# Patient Record
Sex: FEMALE | NOT HISPANIC OR LATINO
[De-identification: names, ages, dates, MRNs, and addresses within clinical notes are randomized per-mention and may not be internally consistent; named-entity substitution may affect disease eponyms.]

---

## 2024-06-18 ENCOUNTER — DASHBOARD ENCOUNTERS (OUTPATIENT)
Age: 54
End: 2024-06-18

## 2024-06-18 ENCOUNTER — TELEPHONE ENCOUNTER (OUTPATIENT)
Dept: URBAN - METROPOLITAN AREA CLINIC 80 | Facility: CLINIC | Age: 54
End: 2024-06-18

## 2024-06-18 ENCOUNTER — OFFICE VISIT (OUTPATIENT)
Dept: URBAN - METROPOLITAN AREA CLINIC 2 | Facility: CLINIC | Age: 54
End: 2024-06-18
Payer: COMMERCIAL

## 2024-06-18 VITALS
HEIGHT: 60 IN | WEIGHT: 125 LBS | HEART RATE: 93 BPM | DIASTOLIC BLOOD PRESSURE: 90 MMHG | TEMPERATURE: 98.5 F | BODY MASS INDEX: 24.54 KG/M2 | SYSTOLIC BLOOD PRESSURE: 124 MMHG

## 2024-06-18 DIAGNOSIS — K58.2 IRRITABLE BOWEL SYNDROME WITH BOTH CONSTIPATION AND DIARRHEA: ICD-10-CM

## 2024-06-18 DIAGNOSIS — Z12.11 COLON CANCER SCREENING: ICD-10-CM

## 2024-06-18 PROBLEM — 10743008: Status: ACTIVE | Noted: 2024-06-18

## 2024-06-18 PROCEDURE — 99243 OFF/OP CNSLTJ NEW/EST LOW 30: CPT | Performed by: NURSE PRACTITIONER

## 2024-06-18 PROCEDURE — 99203 OFFICE O/P NEW LOW 30 MIN: CPT | Performed by: NURSE PRACTITIONER

## 2024-06-18 RX ORDER — LEVOTHYROXINE SODIUM 25 UG/1
TABLET ORAL
Qty: 90 TABLET | Status: ACTIVE | COMMUNITY

## 2024-06-18 RX ORDER — EZETIMIBE 10 MG/1
TAKE 1 TABLET BY MOUTH EVERY DAY FOR 90 DAYS TABLET ORAL
Qty: 90 EACH | Refills: 0 | Status: ACTIVE | COMMUNITY

## 2024-06-18 RX ORDER — TOPIRAMATE 25 MG/1
TAKE 1 TABLET BY MOUTH EVERY DAY TABLET ORAL
Qty: 90 EACH | Refills: 0 | Status: ACTIVE | COMMUNITY

## 2024-06-18 RX ORDER — EPINEPHRINE 0.3 MG/.3ML
INJECTION SUBCUTANEOUS
Qty: 2 EACH | Status: ACTIVE | COMMUNITY

## 2024-06-18 RX ORDER — OMALIZUMAB 150 MG/ML
INJECTION, SOLUTION SUBCUTANEOUS
Qty: 2 MILLILITER | Status: ACTIVE | COMMUNITY

## 2024-06-18 RX ORDER — CLONAZEPAM 1 MG/1
TAKE 1 TABLET BY MOUTH EVERY DAY TABLET ORAL
Qty: 30 EACH | Refills: 3 | Status: ACTIVE | COMMUNITY

## 2024-06-18 RX ORDER — MONTELUKAST SODIUM 10 MG/1
TAKE 1 TABLET BY MOUTH EVERY DAY FOR 90 DAYS TABLET, FILM COATED ORAL
Qty: 90 EACH | Refills: 1 | Status: ACTIVE | COMMUNITY

## 2024-06-18 RX ORDER — SOD SULF/POT CHLORIDE/MAG SULF 1.479 G
12 TABLETS THE FIRST DOSE THE EVENING BEFORE AND SECOND DOSE THE MORNING OF COLONOSCOPY TABLET ORAL TWICE A DAY
Qty: 24 | OUTPATIENT
Start: 2024-06-18 | End: 2024-06-19

## 2024-06-18 RX ORDER — ESCITALOPRAM OXALATE 20 MG/1
TAKE 1 TABLET BY MOUTH EVERY DAY FOR 90 DAYS TABLET ORAL
Qty: 90 EACH | Refills: 0 | Status: ACTIVE | COMMUNITY

## 2024-06-18 RX ORDER — DICYCLOMINE HYDROCHLORIDE 20 MG/1
1 TABLET TABLET ORAL
Qty: 90 TABLET | Refills: 1 | OUTPATIENT
Start: 2024-06-18 | End: 2024-08-17

## 2024-06-18 NOTE — HPI-TODAY'S VISIT:
Very pleasant 53 yr old female seen today for c/o bloating, constipation and abdominal pain. This patient was referred by Dr. Mohsin Hisamuddin for an evaluation of above.  A copy of this will be sent to the referring provider.  Very pleasant 53-year-old female seen today for colon cancer screening.  She did have a normal Cologuard last year.  No known family history of colon polyps or colon cancer.  Patient also has a history of what sounds like IBS with both diarrhea and constipation.  She does have lower abdominal cramping.  She did take a dicyclomine from her sister which helped with the cramping.  She also reports a history of diverticulosis.  She had her gallbladder removed 2 years ago.  Since then she has had urgency with bowel movements especially after meals.

## 2024-06-18 NOTE — PHYSICAL EXAM SKIN:
no rashes , no jaundice present , good turgor , no masses , no tenderness on palpation Spontaneous, unlabored and symmetrical

## 2024-06-24 ENCOUNTER — OUT OF OFFICE VISIT (OUTPATIENT)
Dept: URBAN - METROPOLITAN AREA SURGERY CENTER 19 | Facility: SURGERY CENTER | Age: 54
End: 2024-06-24
Payer: COMMERCIAL

## 2024-06-24 ENCOUNTER — OFFICE VISIT (OUTPATIENT)
Dept: URBAN - METROPOLITAN AREA SURGERY CENTER 19 | Facility: SURGERY CENTER | Age: 54
End: 2024-06-24

## 2024-06-24 ENCOUNTER — CLAIMS CREATED FROM THE CLAIM WINDOW (OUTPATIENT)
Dept: URBAN - METROPOLITAN AREA CLINIC 4 | Facility: CLINIC | Age: 54
End: 2024-06-24
Payer: COMMERCIAL

## 2024-06-24 DIAGNOSIS — K63.5 COLON POLYPS: ICD-10-CM

## 2024-06-24 DIAGNOSIS — K63.89 OTHER SPECIFIED DISEASES OF INTESTINE: ICD-10-CM

## 2024-06-24 DIAGNOSIS — Z12.11 COLON CANCER SCREENING: ICD-10-CM

## 2024-06-24 DIAGNOSIS — D12.5 BENIGN NEOPLASM OF SIGMOID COLON: ICD-10-CM

## 2024-06-24 DIAGNOSIS — D12.4 BENIGN NEOPLASM OF DESCENDING COLON: ICD-10-CM

## 2024-06-24 DIAGNOSIS — K57.30 DIVERTICULA OF COLON: ICD-10-CM

## 2024-06-24 PROCEDURE — 00812 ANES LWR INTST SCR COLSC: CPT | Performed by: ANESTHESIOLOGY

## 2024-06-24 PROCEDURE — 88305 TISSUE EXAM BY PATHOLOGIST: CPT | Performed by: PATHOLOGY

## 2024-06-24 PROCEDURE — 00812 ANES LWR INTST SCR COLSC: CPT | Performed by: STUDENT IN AN ORGANIZED HEALTH CARE EDUCATION/TRAINING PROGRAM

## 2024-06-27 ENCOUNTER — TELEPHONE ENCOUNTER (OUTPATIENT)
Dept: URBAN - METROPOLITAN AREA CLINIC 2 | Facility: CLINIC | Age: 54
End: 2024-06-27

## 2024-06-27 RX ORDER — BISACODYL 5 MG
1 TABLET AS NEEDED TABLET, DELAYED RELEASE (ENTERIC COATED) ORAL TWICE A DAY
Qty: 60 TABLET | Refills: 1 | OUTPATIENT
Start: 2024-06-28 | End: 2024-08-27

## 2024-07-01 ENCOUNTER — TELEPHONE ENCOUNTER (OUTPATIENT)
Dept: URBAN - METROPOLITAN AREA CLINIC 80 | Facility: CLINIC | Age: 54
End: 2024-07-01

## 2024-07-02 PROBLEM — 397881000: Status: ACTIVE | Noted: 2024-07-02

## 2024-07-02 PROBLEM — 428283002: Status: ACTIVE | Noted: 2024-07-02

## 2024-07-03 ENCOUNTER — OFFICE VISIT (OUTPATIENT)
Dept: URBAN - METROPOLITAN AREA CLINIC 74 | Facility: CLINIC | Age: 54
End: 2024-07-03
Payer: COMMERCIAL

## 2024-07-03 ENCOUNTER — LAB OUTSIDE AN ENCOUNTER (OUTPATIENT)
Dept: URBAN - METROPOLITAN AREA CLINIC 74 | Facility: CLINIC | Age: 54
End: 2024-07-03

## 2024-07-03 VITALS
HEART RATE: 83 BPM | OXYGEN SATURATION: 95 % | DIASTOLIC BLOOD PRESSURE: 92 MMHG | HEIGHT: 60 IN | BODY MASS INDEX: 24.66 KG/M2 | WEIGHT: 125.6 LBS | TEMPERATURE: 98.6 F | SYSTOLIC BLOOD PRESSURE: 160 MMHG

## 2024-07-03 DIAGNOSIS — K44.9 HIATAL HERNIA: ICD-10-CM

## 2024-07-03 DIAGNOSIS — R11.2 NAUSEA AND VOMITING IN ADULT: ICD-10-CM

## 2024-07-03 DIAGNOSIS — R10.13 ABDOMINAL DISCOMFORT, EPIGASTRIC: ICD-10-CM

## 2024-07-03 DIAGNOSIS — K52.89 (LYMPHOCYTIC) MICROSCOPIC COLITIS: ICD-10-CM

## 2024-07-03 PROCEDURE — 99214 OFFICE O/P EST MOD 30 MIN: CPT | Performed by: PHYSICIAN ASSISTANT

## 2024-07-03 RX ORDER — OMALIZUMAB 150 MG/ML
INJECTION, SOLUTION SUBCUTANEOUS
Qty: 2 MILLILITER | Status: ACTIVE | COMMUNITY

## 2024-07-03 RX ORDER — EZETIMIBE 10 MG/1
TAKE 1 TABLET BY MOUTH EVERY DAY FOR 90 DAYS TABLET ORAL
Qty: 90 EACH | Refills: 0 | Status: ACTIVE | COMMUNITY

## 2024-07-03 RX ORDER — PANTOPRAZOLE SODIUM 40 MG/1
1 TABLET TABLET, DELAYED RELEASE ORAL ONCE A DAY
Qty: 30 TABLET | Refills: 3 | OUTPATIENT
Start: 2024-07-03

## 2024-07-03 RX ORDER — BISACODYL 5 MG
1 TABLET AS NEEDED TABLET, DELAYED RELEASE (ENTERIC COATED) ORAL TWICE A DAY
Qty: 60 TABLET | Refills: 1 | Status: ON HOLD | COMMUNITY
Start: 2024-06-28 | End: 2024-08-27

## 2024-07-03 RX ORDER — DICYCLOMINE HYDROCHLORIDE 20 MG/1
1 TABLET TABLET ORAL
Qty: 90 TABLET | Refills: 1 | Status: ACTIVE | COMMUNITY
Start: 2024-06-18 | End: 2024-08-17

## 2024-07-03 RX ORDER — EPINEPHRINE 0.3 MG/.3ML
INJECTION SUBCUTANEOUS
Qty: 2 EACH | Status: ACTIVE | COMMUNITY

## 2024-07-03 RX ORDER — CLONAZEPAM 1 MG/1
TAKE 1 TABLET BY MOUTH EVERY DAY TABLET ORAL
Qty: 30 EACH | Refills: 3 | Status: ON HOLD | COMMUNITY

## 2024-07-03 RX ORDER — LEVOTHYROXINE SODIUM 25 UG/1
TABLET ORAL
Qty: 90 TABLET | Status: ACTIVE | COMMUNITY

## 2024-07-03 RX ORDER — MONTELUKAST SODIUM 10 MG/1
TAKE 1 TABLET BY MOUTH EVERY DAY FOR 90 DAYS TABLET, FILM COATED ORAL
Qty: 90 EACH | Refills: 1 | Status: ACTIVE | COMMUNITY

## 2024-07-03 RX ORDER — ESCITALOPRAM OXALATE 20 MG/1
TAKE 1 TABLET BY MOUTH EVERY DAY FOR 90 DAYS TABLET ORAL
Qty: 90 EACH | Refills: 0 | Status: ACTIVE | COMMUNITY

## 2024-07-03 RX ORDER — TOPIRAMATE 25 MG/1
TAKE 1 TABLET BY MOUTH EVERY DAY TABLET ORAL
Qty: 90 EACH | Refills: 0 | Status: ON HOLD | COMMUNITY

## 2024-07-03 NOTE — PHYSICAL EXAM GASTROINTESTINAL
Abdomen , soft, TTP, nondistended , no guarding or rigidity , no masses palpable , normal bowel sounds , Liver and Spleen,  no hepatosplenomegaly , liver nontender

## 2024-07-03 NOTE — HPI-TODAY'S VISIT:
The patient is 54-year-old female with past medical history as noted below known to Dr. Willoughby is presenting to our clinic today for follow-up appointment to discuss her recent colonoscopy results and abdominal pain.  The patient was seen on 07/01/2024 in ED at [facility] with abdominal pain. She was advised to follow up with GI physician and PCP. Labs and imaging as noted below.  The patient was seen in ED at  06/14/2024 with complaint of headache.  Labs and imaging as noted below.  She was treated conservatively and advised to follow-up with her PCP.  The patient was also seen recently at [facility] on 07/02/2024 with complaint of not feeling well. The patient is complaining  of persistent nausea, vomiting, diarhea, and abdominal cramping. She is currently on Dicyclomine 20 mg and Zofran 4 mg as needed with poor response and no improvement. She has been to ED on and off since 01/2024.    Diagnostic studies: -- Labs on 07/01/2024 CBC with WBC 5.7, hemoglobin 14.6, hematocrit 43.3, and platelet 306.  CMP with BUN 18, creatinine 1.9, ALT 36, AST 35, ALP 55, and TB 8.7.  UA unremarkable.  -- CT scan of abdomen and pelvis with IV contrast on 07/01/2024 noted fluid in the colon suggestive of diarrhea.  Otherwise no acute findings in the abdomen and pelvis.  -- Labs on 06/14/2024 CMP with BUN 21, creatinine 1.0, ALT 27, AST 30, ALP 5050, and TB 0.5.  CBC with WBC 6.3, hemoglobin 14.2, hematocrit 40.4, and platelet 290.  PT 11.9, PTT 34.3, and INR 1.09.  -- CT scan of the abdomen and pelvis with IV contrast on 05/01/2024 noted colonic diverticulosis.  Small pericardial effusion.  Liver, spleen, pancreas, and adrenal glands are unremarkable.  No acute abnormality in the abdomen or pelvis noted.  -- CT scan of abdomen and pelvis without IV contrast on 04/10/2024 with stable nonobstructing calculus in the left renal collecting system.  No evidence of hydronephrosis. There is a nonobstructing hiatal hernia.  -- CT scan of abdomen and pelvis with IV contrast with punctate nonobstructing left renal calculus. Small hiatal hernia. Colonic diverticulosis.   Procedures: -- Colonoscopy with polypectomy on 06/20/2024 by Dr. Willoughby noted preparation of the colon was fair.  Congested mucosa in the terminal ileum.  Erythematous mucosa entire examined colon. One 6 mm polyp in the descending colon, removed with a cold snare.  Resected and retrieved. One 5 mm polyp in the sigmoid colon, removed with a cold snare.  Resected and retrieved.  Diverticulosis in sigmoid colon.  Examination was otherwise normal on direct and retroflexion view.  Repeat colonoscopy in 5 years for surveillance.  Biopsy tubular adenoma colon polyps.  Random biopsy of colon with no significant abnormality.

## 2024-07-08 ENCOUNTER — OFFICE VISIT (OUTPATIENT)
Dept: URBAN - METROPOLITAN AREA TELEHEALTH 2 | Facility: TELEHEALTH | Age: 54
End: 2024-07-08
Payer: COMMERCIAL

## 2024-07-08 DIAGNOSIS — R11.2 NAUSEA AND VOMITING, UNSPECIFIED VOMITING TYPE: ICD-10-CM

## 2024-07-08 PROCEDURE — 99213 OFFICE O/P EST LOW 20 MIN: CPT | Performed by: NURSE PRACTITIONER

## 2024-07-08 RX ORDER — PANTOPRAZOLE SODIUM 40 MG/1
1 TABLET TABLET, DELAYED RELEASE ORAL ONCE A DAY
Qty: 30 TABLET | Refills: 3 | Status: ACTIVE | COMMUNITY
Start: 2024-07-03

## 2024-07-08 RX ORDER — EZETIMIBE 10 MG/1
TAKE 1 TABLET BY MOUTH EVERY DAY FOR 90 DAYS TABLET ORAL
Qty: 90 EACH | Refills: 0 | Status: ACTIVE | COMMUNITY

## 2024-07-08 RX ORDER — LEVOTHYROXINE SODIUM 25 UG/1
TABLET ORAL
Qty: 90 TABLET | Status: ACTIVE | COMMUNITY

## 2024-07-08 RX ORDER — EPINEPHRINE 0.3 MG/.3ML
INJECTION SUBCUTANEOUS
Qty: 2 EACH | Status: ACTIVE | COMMUNITY

## 2024-07-08 RX ORDER — OMALIZUMAB 150 MG/ML
INJECTION, SOLUTION SUBCUTANEOUS
Qty: 2 MILLILITER | Status: ACTIVE | COMMUNITY

## 2024-07-08 RX ORDER — TOPIRAMATE 25 MG/1
TAKE 1 TABLET BY MOUTH EVERY DAY TABLET ORAL
Qty: 90 EACH | Refills: 0 | Status: ON HOLD | COMMUNITY

## 2024-07-08 RX ORDER — MONTELUKAST SODIUM 10 MG/1
TAKE 1 TABLET BY MOUTH EVERY DAY FOR 90 DAYS TABLET, FILM COATED ORAL
Qty: 90 EACH | Refills: 1 | Status: ACTIVE | COMMUNITY

## 2024-07-08 RX ORDER — CIPROFLOXACIN 500 MG/1
1 TABLET TABLET, FILM COATED ORAL
Qty: 14 TABLET | Refills: 0 | OUTPATIENT
Start: 2024-07-08 | End: 2024-07-15

## 2024-07-08 RX ORDER — BISACODYL 5 MG
1 TABLET AS NEEDED TABLET, DELAYED RELEASE (ENTERIC COATED) ORAL TWICE A DAY
Qty: 60 TABLET | Refills: 1 | Status: ON HOLD | COMMUNITY
Start: 2024-06-28 | End: 2024-08-27

## 2024-07-08 RX ORDER — DICYCLOMINE HYDROCHLORIDE 20 MG/1
1 TABLET TABLET ORAL
Qty: 90 TABLET | Refills: 1 | Status: ACTIVE | COMMUNITY
Start: 2024-06-18 | End: 2024-08-17

## 2024-07-08 RX ORDER — CLONAZEPAM 1 MG/1
TAKE 1 TABLET BY MOUTH EVERY DAY TABLET ORAL
Qty: 30 EACH | Refills: 3 | Status: ON HOLD | COMMUNITY

## 2024-07-08 RX ORDER — ESCITALOPRAM OXALATE 20 MG/1
TAKE 1 TABLET BY MOUTH EVERY DAY FOR 90 DAYS TABLET ORAL
Qty: 90 EACH | Refills: 0 | Status: ACTIVE | COMMUNITY

## 2024-07-08 NOTE — HPI-TODAY'S VISIT:
Very pleasant 53 yr old female seen today via telehealth with c/o of abd pain, n/v Since coloonoscopy 6/24/24 pt having abdominal pain and n/v.  not able to eat-lots of pain pelvix to chest, went to ER and urgent care-normal labs and CT-inchart and reviewed ppi and zofran-helping some now can drink without vomiting, cannot tolerate solids ongoing x2 weeks

## 2024-07-11 ENCOUNTER — CLAIMS CREATED FROM THE CLAIM WINDOW (OUTPATIENT)
Dept: URBAN - METROPOLITAN AREA SURGERY CENTER 31 | Facility: SURGERY CENTER | Age: 54
End: 2024-07-11
Payer: COMMERCIAL

## 2024-07-11 DIAGNOSIS — K44.9 HIATAL HERNIA: ICD-10-CM

## 2024-07-11 DIAGNOSIS — K22.9 IRREGULAR Z LINE OF ESOPHAGUS: ICD-10-CM

## 2024-07-11 DIAGNOSIS — R11.2 ACUTE NAUSEA WITH NONBILIOUS VOMITING: ICD-10-CM

## 2024-07-11 DIAGNOSIS — K29.60 ADENOPAPILLOMATOSIS GASTRICA: ICD-10-CM

## 2024-07-11 DIAGNOSIS — R12 BURNING REFLUX: ICD-10-CM

## 2024-07-11 DIAGNOSIS — K29.70 GASTRITIS: ICD-10-CM

## 2024-07-11 PROCEDURE — 00731 ANES UPR GI NDSC PX NOS: CPT | Performed by: STUDENT IN AN ORGANIZED HEALTH CARE EDUCATION/TRAINING PROGRAM

## 2024-07-11 PROCEDURE — 43239 EGD BIOPSY SINGLE/MULTIPLE: CPT | Performed by: INTERNAL MEDICINE

## 2024-07-12 ENCOUNTER — LAB OUTSIDE AN ENCOUNTER (OUTPATIENT)
Dept: URBAN - METROPOLITAN AREA CLINIC 74 | Facility: CLINIC | Age: 54
End: 2024-07-12

## 2024-07-17 LAB
ADENOVIRUS F 40/41: NOT DETECTED
CAMPYLOBACTER: NOT DETECTED
CLOSTRIDIUM DIFFICILE: NOT DETECTED
ENTAMOEBA HISTOLYTICA: NOT DETECTED
ENTEROAGGREGATIVE E.COLI: NOT DETECTED
ENTEROTOXIGENIC E.COLI: NOT DETECTED
ESCHERICHIA COLI O157: NOT DETECTED
GIARDIA LAMBLIA: NOT DETECTED
NOROVIRUS GI/GII: NOT DETECTED
PANCREATICELASTASE ELISA, STOOL: (no result)
ROTAVIRUS A: NOT DETECTED
SALMONELLA SPP.: NOT DETECTED
SHIGA-LIKE TOXIN PRODUCING E.COLI: NOT DETECTED
SHIGELLA SPP. / ENTEROINVASIVE E.COLI: NOT DETECTED
STOOL, WHITE BLOOD CELLS: (no result)
VIBRIO PARAHAEMOLYTICUS: NOT DETECTED
VIBRIO SPP.: NOT DETECTED
YERSINIA ENTEROCOLITICA: NOT DETECTED

## 2024-08-01 ENCOUNTER — OFFICE VISIT (OUTPATIENT)
Dept: URBAN - METROPOLITAN AREA CLINIC 2 | Facility: CLINIC | Age: 54
End: 2024-08-01
Payer: COMMERCIAL

## 2024-08-01 VITALS
WEIGHT: 117.8 LBS | TEMPERATURE: 97.6 F | HEIGHT: 60 IN | SYSTOLIC BLOOD PRESSURE: 104 MMHG | BODY MASS INDEX: 23.13 KG/M2 | HEART RATE: 86 BPM | DIASTOLIC BLOOD PRESSURE: 75 MMHG

## 2024-08-01 DIAGNOSIS — Z11.59 NEED FOR HEPATITIS C SCREENING TEST: ICD-10-CM

## 2024-08-01 DIAGNOSIS — R10.84 GENERALIZED ABDOMINAL PAIN: ICD-10-CM

## 2024-08-01 DIAGNOSIS — K29.60 ADENOPAPILLOMATOSIS GASTRICA: ICD-10-CM

## 2024-08-01 DIAGNOSIS — K90.89 BILE ACID MALABSORPTION SYNDROME: ICD-10-CM

## 2024-08-01 PROBLEM — 44332000: Status: ACTIVE | Noted: 2024-08-01

## 2024-08-01 PROBLEM — 8493009: Status: ACTIVE | Noted: 2024-08-01

## 2024-08-01 PROCEDURE — 99214 OFFICE O/P EST MOD 30 MIN: CPT | Performed by: NURSE PRACTITIONER

## 2024-08-01 RX ORDER — OMALIZUMAB 150 MG/ML
INJECTION, SOLUTION SUBCUTANEOUS
Qty: 2 MILLILITER | Status: ACTIVE | COMMUNITY

## 2024-08-01 RX ORDER — EZETIMIBE 10 MG/1
TAKE 1 TABLET BY MOUTH EVERY DAY FOR 90 DAYS TABLET ORAL
Qty: 90 EACH | Refills: 0 | Status: ACTIVE | COMMUNITY

## 2024-08-01 RX ORDER — EPINEPHRINE 0.3 MG/.3ML
INJECTION SUBCUTANEOUS
Qty: 2 EACH | Status: ACTIVE | COMMUNITY

## 2024-08-01 RX ORDER — CLONAZEPAM 1 MG/1
TAKE 1 TABLET BY MOUTH EVERY DAY TABLET ORAL
Qty: 30 EACH | Refills: 3 | Status: ON HOLD | COMMUNITY

## 2024-08-01 RX ORDER — TOPIRAMATE 25 MG/1
TAKE 1 TABLET BY MOUTH EVERY DAY TABLET ORAL
Qty: 90 EACH | Refills: 0 | Status: ON HOLD | COMMUNITY

## 2024-08-01 RX ORDER — COLESTIPOL HYDROCHLORIDE 1 G/1
1 TABLETS TABLET, FILM COATED ORAL ONCE A DAY
Qty: 30 TABLET | Refills: 3 | OUTPATIENT
Start: 2024-08-01

## 2024-08-01 RX ORDER — LEVOTHYROXINE SODIUM 25 UG/1
TABLET ORAL
Qty: 90 TABLET | Status: ACTIVE | COMMUNITY

## 2024-08-01 RX ORDER — PANTOPRAZOLE SODIUM 40 MG/1
1 TABLET TABLET, DELAYED RELEASE ORAL ONCE A DAY
Qty: 30 TABLET | Refills: 3 | Status: ACTIVE | COMMUNITY
Start: 2024-07-03

## 2024-08-01 RX ORDER — MONTELUKAST SODIUM 10 MG/1
TAKE 1 TABLET BY MOUTH EVERY DAY FOR 90 DAYS TABLET, FILM COATED ORAL
Qty: 90 EACH | Refills: 1 | Status: ACTIVE | COMMUNITY

## 2024-08-01 RX ORDER — BISACODYL 5 MG
1 TABLET AS NEEDED TABLET, DELAYED RELEASE (ENTERIC COATED) ORAL TWICE A DAY
Qty: 60 TABLET | Refills: 1 | Status: ON HOLD | COMMUNITY
Start: 2024-06-28 | End: 2024-08-27

## 2024-08-01 RX ORDER — DICYCLOMINE HYDROCHLORIDE 20 MG/1
1 TABLET TABLET ORAL
Qty: 90 TABLET | Refills: 1 | Status: ACTIVE | COMMUNITY
Start: 2024-06-18 | End: 2024-08-17

## 2024-08-01 RX ORDER — ESCITALOPRAM OXALATE 20 MG/1
TAKE 1 TABLET BY MOUTH EVERY DAY FOR 90 DAYS TABLET ORAL
Qty: 90 EACH | Refills: 0 | Status: ACTIVE | COMMUNITY

## 2024-08-01 NOTE — HPI-TODAY'S VISIT:
Very pleasant  53-year-old female seen today in follow-up after an EGD and evaluation of abdominal pain with nausea and vomiting that began after her colonoscopy on June 24.  In recap she had been to the ER and had negative labs.  CT indicated possible diarrheal illness.  Given the severity of her pain and diarrhea we did give ciprofloxacin for possible gastroenteritis.  Her symptoms persisted so patient did proceed with EGD.  EGD was notable for irregular Z-line and inflammation in the stomach, a small hiatal hernia but a normal-appearing duodenum.  Biopsies were notable for mild chronic gastritis.  Duodenal biopsies were normal.  Unfortunately after EGD patient had persistent nausea and vomiting so she did go to the ER as instructed by Dr. Willoughby.  Symptoms had resolved by the time she reached the ER.  So she was discharged.  She did have a gastric emptying scan on July 16 that was normal.  She also had stool studies that were negative for infection and fecal elastase was normal. Results in chart and reviewed. She returns in follow up today. Abdominal pain and n/v have resolved. She continues to have loose oily/yellow stools but then can have eisodes of constipation. Dicyclomine helped with abd pain but did cause some constipation. had cholecystectomy 2 yrs ago-diarrhea began at that time-waxed and waned. then had diarrhea illness in Feb and diarrhea did not resolve till now. she has never been tested for hep C and would like screening. She does work in healthcare.

## 2024-08-21 ENCOUNTER — OFFICE VISIT (OUTPATIENT)
Dept: URBAN - METROPOLITAN AREA CLINIC 2 | Facility: CLINIC | Age: 54
End: 2024-08-21
Payer: COMMERCIAL

## 2024-08-21 VITALS
WEIGHT: 116.6 LBS | SYSTOLIC BLOOD PRESSURE: 137 MMHG | HEART RATE: 87 BPM | BODY MASS INDEX: 22.89 KG/M2 | DIASTOLIC BLOOD PRESSURE: 93 MMHG | HEIGHT: 60 IN | TEMPERATURE: 98.4 F

## 2024-08-21 DIAGNOSIS — R11.2 NAUSEA AND VOMITING IN ADULT: ICD-10-CM

## 2024-08-21 DIAGNOSIS — R10.84 GENERALIZED ABDOMINAL PAIN: ICD-10-CM

## 2024-08-21 DIAGNOSIS — K22.4 ESOPHAGEAL DYSMOTILITY: ICD-10-CM

## 2024-08-21 DIAGNOSIS — K52.89 OTHER AND UNSPECIFIED NONINFECTIOUS GASTROENTERITIS AND COLITIS: ICD-10-CM

## 2024-08-21 PROBLEM — 266434009: Status: ACTIVE | Noted: 2024-08-21

## 2024-08-21 PROCEDURE — 99214 OFFICE O/P EST MOD 30 MIN: CPT | Performed by: NURSE PRACTITIONER

## 2024-08-21 RX ORDER — PANTOPRAZOLE SODIUM 40 MG/1
1 TABLET TABLET, DELAYED RELEASE ORAL ONCE A DAY
Qty: 90 TABLET | Refills: 3 | OUTPATIENT
Start: 2024-08-21

## 2024-08-21 RX ORDER — COLESTIPOL HYDROCHLORIDE 1 G/1
1 TABLETS TABLET, FILM COATED ORAL ONCE A DAY
Qty: 30 TABLET | Refills: 3 | Status: ACTIVE | COMMUNITY
Start: 2024-08-01

## 2024-08-21 RX ORDER — HYOSCYAMINE SULFATE 0.125 MG
1 TABLET ON THE TONGUE AND ALLOW TO DISSOLVE  AS NEEDED TABLET,DISINTEGRATING ORAL
Status: ACTIVE | COMMUNITY

## 2024-08-21 RX ORDER — AMITRIPTYLINE HYDROCHLORIDE 10 MG/1
1 TABLET AT BEDTIME TABLET, FILM COATED ORAL ONCE A DAY
Qty: 30 | OUTPATIENT
Start: 2024-08-21

## 2024-08-21 RX ORDER — EZETIMIBE 10 MG/1
TAKE 1 TABLET BY MOUTH EVERY DAY FOR 90 DAYS TABLET ORAL
Qty: 90 EACH | Refills: 0 | Status: ACTIVE | COMMUNITY

## 2024-08-21 RX ORDER — ONDANSETRON HYDROCHLORIDE 8 MG/1
1 TABLET AS NEEDED TABLET, FILM COATED ORAL ONCE A DAY
Status: ACTIVE | COMMUNITY

## 2024-08-21 RX ORDER — OMALIZUMAB 150 MG/ML
INJECTION, SOLUTION SUBCUTANEOUS
Qty: 2 MILLILITER | Status: ACTIVE | COMMUNITY

## 2024-08-21 RX ORDER — MONTELUKAST SODIUM 10 MG/1
TAKE 1 TABLET BY MOUTH EVERY DAY FOR 90 DAYS TABLET, FILM COATED ORAL
Qty: 90 EACH | Refills: 1 | Status: ACTIVE | COMMUNITY

## 2024-08-21 RX ORDER — EPINEPHRINE 0.3 MG/.3ML
INJECTION SUBCUTANEOUS
Qty: 2 EACH | Status: ACTIVE | COMMUNITY

## 2024-08-21 RX ORDER — ESCITALOPRAM OXALATE 20 MG/1
TAKE 1 TABLET BY MOUTH EVERY DAY FOR 90 DAYS TABLET ORAL
Qty: 90 EACH | Refills: 0 | Status: ACTIVE | COMMUNITY

## 2024-08-21 RX ORDER — CLONAZEPAM 1 MG/1
TAKE 1 TABLET BY MOUTH EVERY DAY TABLET ORAL
Qty: 30 EACH | Refills: 3 | Status: ON HOLD | COMMUNITY

## 2024-08-21 RX ORDER — BISACODYL 5 MG
1 TABLET AS NEEDED TABLET, DELAYED RELEASE (ENTERIC COATED) ORAL TWICE A DAY
Qty: 60 TABLET | Refills: 1 | Status: ON HOLD | COMMUNITY
Start: 2024-06-28 | End: 2024-08-27

## 2024-08-21 RX ORDER — BENZONATATE 100 MG/1
1 CAPSULE AS NEEDED CAPSULE ORAL THREE TIMES A DAY
Status: ACTIVE | COMMUNITY

## 2024-08-21 RX ORDER — ALBUTEROL SULFATE AND BUDESONIDE 90; 80 UG/1; UG/1
2 PUFFS AS NEEDED AEROSOL, METERED RESPIRATORY (INHALATION)
Status: ACTIVE | COMMUNITY

## 2024-08-21 RX ORDER — PANTOPRAZOLE SODIUM 40 MG/1
1 TABLET TABLET, DELAYED RELEASE ORAL ONCE A DAY
Qty: 30 TABLET | Refills: 3 | Status: ACTIVE | COMMUNITY
Start: 2024-07-03

## 2024-08-21 RX ORDER — LEVOTHYROXINE SODIUM 25 UG/1
TABLET ORAL
Qty: 90 TABLET | Status: ACTIVE | COMMUNITY

## 2024-08-21 RX ORDER — TOPIRAMATE 25 MG/1
TAKE 1 TABLET BY MOUTH EVERY DAY TABLET ORAL
Qty: 90 EACH | Refills: 0 | Status: ON HOLD | COMMUNITY

## 2024-08-21 NOTE — HPI-TODAY'S VISIT:
Very pleasant but anxious 53-year-old female seen in follow-up for continued nausea and vomiting and abdominal pain.  She recently had a barium swallow that was notable for esophageal spasms.  She has seen her primary care and was given hyoscyamine and ondansetron.  She has been out of pantoprazole but denies typical heartburn symptoms. EGD with normal esophagus and gastritis. She continues to have diarrhea . She has been taking colestipol for the diarrhea. This can cause constipation so she takes stool softeners. She is concerned about nutritional status due to inability to tolerate po.   She can tolerate sips of liquids usually water.  She is trying to get nourishment from [employer].

## 2024-08-27 ENCOUNTER — OFFICE VISIT (OUTPATIENT)
Dept: URBAN - METROPOLITAN AREA CLINIC 80 | Facility: CLINIC | Age: 54
End: 2024-08-27

## 2024-08-28 ENCOUNTER — TELEPHONE ENCOUNTER (OUTPATIENT)
Dept: URBAN - METROPOLITAN AREA CLINIC 80 | Facility: CLINIC | Age: 54
End: 2024-08-28

## 2024-08-28 ENCOUNTER — LAB OUTSIDE AN ENCOUNTER (OUTPATIENT)
Dept: URBAN - METROPOLITAN AREA CLINIC 80 | Facility: CLINIC | Age: 54
End: 2024-08-28

## 2024-08-28 ENCOUNTER — OFFICE VISIT (OUTPATIENT)
Dept: URBAN - METROPOLITAN AREA CLINIC 80 | Facility: CLINIC | Age: 54
End: 2024-08-28
Payer: COMMERCIAL

## 2024-08-28 ENCOUNTER — OFFICE VISIT (OUTPATIENT)
Dept: URBAN - METROPOLITAN AREA CLINIC 80 | Facility: CLINIC | Age: 54
End: 2024-08-28

## 2024-08-28 VITALS
HEART RATE: 100 BPM | WEIGHT: 115 LBS | TEMPERATURE: 97.9 F | BODY MASS INDEX: 22.58 KG/M2 | DIASTOLIC BLOOD PRESSURE: 86 MMHG | HEIGHT: 60 IN | SYSTOLIC BLOOD PRESSURE: 130 MMHG

## 2024-08-28 DIAGNOSIS — R19.7 CHRONIC DIARRHEA: ICD-10-CM

## 2024-08-28 DIAGNOSIS — K22.4 ESOPHAGEAL DYSMOTILITY: ICD-10-CM

## 2024-08-28 DIAGNOSIS — R10.84 GENERALIZED ABDOMINAL PAIN: ICD-10-CM

## 2024-08-28 DIAGNOSIS — R11.2 NAUSEA AND VOMITING IN ADULT: ICD-10-CM

## 2024-08-28 PROBLEM — 266434009: Status: ACTIVE | Noted: 2024-08-28

## 2024-08-28 PROCEDURE — 99215 OFFICE O/P EST HI 40 MIN: CPT | Performed by: PHYSICIAN ASSISTANT

## 2024-08-28 RX ORDER — LEVOTHYROXINE SODIUM 25 UG/1
TABLET ORAL
Qty: 90 TABLET | Status: ACTIVE | COMMUNITY

## 2024-08-28 RX ORDER — EPINEPHRINE 0.3 MG/.3ML
AS DIRECTED INJECTION SUBCUTANEOUS
Status: ACTIVE | COMMUNITY

## 2024-08-28 RX ORDER — EPINEPHRINE 0.3 MG/.3ML
INJECTION SUBCUTANEOUS
Qty: 2 EACH | Status: ACTIVE | COMMUNITY

## 2024-08-28 RX ORDER — HYOSCYAMINE SULFATE 0.125 MG
1 TABLET ON THE TONGUE AND ALLOW TO DISSOLVE  AS NEEDED TABLET,DISINTEGRATING ORAL
Status: ACTIVE | COMMUNITY

## 2024-08-28 RX ORDER — ONDANSETRON HYDROCHLORIDE 8 MG/1
1 TABLET AS NEEDED TABLET, FILM COATED ORAL ONCE A DAY
Status: ACTIVE | COMMUNITY

## 2024-08-28 RX ORDER — ESCITALOPRAM OXALATE 20 MG/1
TAKE 1 TABLET BY MOUTH EVERY DAY FOR 90 DAYS TABLET ORAL
Qty: 90 EACH | Refills: 0 | Status: ACTIVE | COMMUNITY

## 2024-08-28 RX ORDER — BENZONATATE 100 MG/1
1 CAPSULE AS NEEDED CAPSULE ORAL THREE TIMES A DAY
Status: ACTIVE | COMMUNITY

## 2024-08-28 RX ORDER — OMALIZUMAB 150 MG/ML
INJECTION, SOLUTION SUBCUTANEOUS
Qty: 2 MILLILITER | Status: ACTIVE | COMMUNITY

## 2024-08-28 RX ORDER — MONTELUKAST SODIUM 10 MG/1
TAKE 1 TABLET BY MOUTH EVERY DAY FOR 90 DAYS TABLET, FILM COATED ORAL
Qty: 90 EACH | Refills: 1 | Status: ACTIVE | COMMUNITY

## 2024-08-28 RX ORDER — PANTOPRAZOLE SODIUM 40 MG/1
1 TABLET TABLET, DELAYED RELEASE ORAL ONCE A DAY
Qty: 90 TABLET | Refills: 3 | Status: ACTIVE | COMMUNITY
Start: 2024-08-21

## 2024-08-28 RX ORDER — PANTOPRAZOLE SODIUM 40 MG/1
1 TABLET TABLET, DELAYED RELEASE ORAL ONCE A DAY
Qty: 30 TABLET | Refills: 3 | Status: ACTIVE | COMMUNITY
Start: 2024-07-03

## 2024-08-28 RX ORDER — AMITRIPTYLINE HYDROCHLORIDE 10 MG/1
1 TABLET AT BEDTIME TABLET, FILM COATED ORAL ONCE A DAY
Qty: 30 | Status: ACTIVE | COMMUNITY
Start: 2024-08-21

## 2024-08-28 RX ORDER — CLONAZEPAM 1 MG/1
TAKE 1 TABLET BY MOUTH EVERY DAY TABLET ORAL
Qty: 30 EACH | Refills: 3 | Status: DISCONTINUED | COMMUNITY

## 2024-08-28 RX ORDER — HYOSCYAMINE SULFATE 0.12 MG/1
1 TABLET UNDER THE TONGUE AND ALLOW TO DISSOLVE TABLET SUBLINGUAL
Qty: 120 | Refills: 6 | OUTPATIENT
Start: 2024-08-28 | End: 2025-03-26

## 2024-08-28 RX ORDER — TOPIRAMATE 25 MG/1
TAKE 1 TABLET BY MOUTH EVERY DAY TABLET ORAL
Qty: 90 EACH | Refills: 0 | Status: ON HOLD | COMMUNITY

## 2024-08-28 RX ORDER — EZETIMIBE 10 MG/1
TAKE 1 TABLET BY MOUTH EVERY DAY FOR 90 DAYS TABLET ORAL
Qty: 90 EACH | Refills: 0 | Status: ACTIVE | COMMUNITY

## 2024-08-28 RX ORDER — ALBUTEROL SULFATE AND BUDESONIDE 90; 80 UG/1; UG/1
2 PUFFS AS NEEDED AEROSOL, METERED RESPIRATORY (INHALATION)
Status: ACTIVE | COMMUNITY

## 2024-08-28 RX ORDER — TOPIRAMATE 25 MG/1
TAKE 1 TABLET BY MOUTH EVERY DAY TABLET ORAL
Qty: 90 EACH | Refills: 0 | Status: DISCONTINUED | COMMUNITY

## 2024-08-28 RX ORDER — COLESTIPOL HYDROCHLORIDE 1 G/1
1 TABLETS TABLET, FILM COATED ORAL ONCE A DAY
Qty: 30 TABLET | Refills: 3 | Status: ACTIVE | COMMUNITY
Start: 2024-08-01

## 2024-08-28 RX ORDER — CLONAZEPAM 1 MG/1
TAKE 1 TABLET BY MOUTH EVERY DAY TABLET ORAL
Qty: 30 EACH | Refills: 3 | Status: ON HOLD | COMMUNITY

## 2024-08-28 RX ORDER — ONDANSETRON 4 MG/1
1 TABLET ON THE TONGUE AND ALLOW TO DISSOLVE TABLET, ORALLY DISINTEGRATING ORAL
Qty: 60 | Refills: 5 | OUTPATIENT
Start: 2024-08-28

## 2024-08-28 RX ORDER — BENZONATATE 100 MG/1
AS DIRECTED CAPSULE ORAL
Status: ACTIVE | COMMUNITY

## 2024-08-28 RX ORDER — PANTOPRAZOLE SODIUM 40 MG/1
1 TABLET TABLET, DELAYED RELEASE ORAL ONCE A DAY
Qty: 90 TABLET | Refills: 3 | Status: DISCONTINUED | COMMUNITY
Start: 2024-08-21

## 2024-08-28 NOTE — HPI-ZZZTODAY'S VISIT
Patient had upper endoscopy on July 11, 2024.  Upper endoscopy showed normal esophagus with an irregular Z-line, small hiatal hernia and erythematous mucosa in the gastric antrum.  Colonoscopy on 6/24/24 showed fair prep, congestion close in the terminal ileum, erythematous mucosa in the entire colon, 2 polyps and diverticulosis.  Pathology showed mild chronic gastritis and the colonoscopy showed normal random colon biopsies and 2 tubular adenomas.  Stool studies done in July were normal.  She had an esophagram done on 8/19/2024 showing discoordinated esophageal peristalsis suggesting esophageal spasm and severe dysmotility and patient had inability to tolerate large contrast boluses.  Gastric emptying study done on  July 16, 2024 showed near complete gastric emptying at 2 hours with no signs of gastroparesis but this finding could be seen in dumping syndrome.  CT scan of the abdomen and pelvis with contrast done in July 2024 showed fluid in the colon suggestive of diarrhea otherwise no acute findings in the abdomen or pelvis.  She was seen a few days ago and started on peppermint altoids before meals, amitriptyline 10 mg and pantoprazole 40 mg a day. she is having episodes of nausea and coughing after drinking anything - 6 times a day she has lost 30 pounds since Feb she had back pain in Jan - thougth it was her kidneys because she had a kidney stone - on Feb 22nd started with diarrhea - went and had tests done - nothing helped - then had the egd and colon - then these symptoms started after her EGD for years she had trouble where food would get stuck when she ate and would have to sit in certain positions to get the food down she is either constipated or diarrhea

## 2024-08-28 NOTE — PHYSICAL EXAM NECK/THYROID:
normal appearance,  without tenderness upon palpation,  no deformities,  no masses,  no thyroid nodules,  Thyroid normal size
GENERAL: well appearing in no acute distress, non-toxic appearing  HEAD: normocephalic, atraumatic  HEENT: normal conjunctiva, oral mucosa moist, neck supple, no JVD  CARDIAC: regular rate and rhythm, normal S1S2, no appreciable murmurs, 2+ pulses in UE/LE b/l  PULM: normal breath sounds, clear to ascultation bilaterally, no rales, rhonchi, wheezing  GI: abdomen nondistended, soft, nontender, no guarding, rebound tenderness  : no CVA tenderness b/l, no suprapubic tenderness  NEURO: no focal motor or sensory deficits, CN2-12 intact, normal speech, PERRLA, EOMI, normal gait, AAOx4 but slow in answering questions  MSK: no peripheral edema, no calf tenderness b/l  SKIN: well-perfused, extremities warm, no visible rashes  PSYCH: appropriate mood and affect

## 2024-08-30 ENCOUNTER — TELEPHONE ENCOUNTER (OUTPATIENT)
Dept: URBAN - METROPOLITAN AREA CLINIC 80 | Facility: CLINIC | Age: 54
End: 2024-08-30

## 2024-09-04 ENCOUNTER — TELEPHONE ENCOUNTER (OUTPATIENT)
Dept: URBAN - METROPOLITAN AREA CLINIC 80 | Facility: CLINIC | Age: 54
End: 2024-09-04

## 2024-09-04 ENCOUNTER — TELEPHONE ENCOUNTER (OUTPATIENT)
Dept: URBAN - METROPOLITAN AREA CLINIC 40 | Facility: CLINIC | Age: 54
End: 2024-09-04

## 2024-09-16 ENCOUNTER — ERX REFILL RESPONSE (OUTPATIENT)
Dept: URBAN - METROPOLITAN AREA CLINIC 2 | Facility: CLINIC | Age: 54
End: 2024-09-16

## 2024-09-16 RX ORDER — AMITRIPTYLINE HYDROCHLORIDE 10 MG/1
1 TABLET AT BEDTIME TABLET, FILM COATED ORAL ONCE A DAY
Qty: 30 | OUTPATIENT

## 2024-09-16 RX ORDER — AMITRIPTYLINE HYDROCHLORIDE 10 MG/1
TAKE 1 TABLET BY MOUTH EVERY DAY AT BEDTIME FOR 30 DAYS TABLET, FILM COATED ORAL
Qty: 90 TABLET | Refills: 1 | OUTPATIENT

## 2024-11-11 ENCOUNTER — OFFICE VISIT (OUTPATIENT)
Dept: URBAN - METROPOLITAN AREA CLINIC 2 | Facility: CLINIC | Age: 54
End: 2024-11-11
Payer: COMMERCIAL

## 2024-11-11 VITALS
HEIGHT: 60 IN | WEIGHT: 125.4 LBS | BODY MASS INDEX: 24.62 KG/M2 | TEMPERATURE: 98.3 F | DIASTOLIC BLOOD PRESSURE: 84 MMHG | SYSTOLIC BLOOD PRESSURE: 130 MMHG | HEART RATE: 74 BPM

## 2024-11-11 DIAGNOSIS — K22.4 ESOPHAGEAL SPASM: ICD-10-CM

## 2024-11-11 DIAGNOSIS — K58.0 IRRITABLE BOWEL SYNDROME WITH DIARRHEA: ICD-10-CM

## 2024-11-11 PROBLEM — 197125005: Status: ACTIVE | Noted: 2024-11-11

## 2024-11-11 PROCEDURE — 99214 OFFICE O/P EST MOD 30 MIN: CPT | Performed by: NURSE PRACTITIONER

## 2024-11-11 RX ORDER — ONDANSETRON HYDROCHLORIDE 8 MG/1
1 TABLET AS NEEDED TABLET, FILM COATED ORAL ONCE A DAY
Status: ACTIVE | COMMUNITY

## 2024-11-11 RX ORDER — BENZONATATE 100 MG/1
1 CAPSULE AS NEEDED CAPSULE ORAL THREE TIMES A DAY
Status: ACTIVE | COMMUNITY

## 2024-11-11 RX ORDER — OMALIZUMAB 150 MG/ML
INJECTION, SOLUTION SUBCUTANEOUS
Qty: 2 MILLILITER | Status: ACTIVE | COMMUNITY

## 2024-11-11 RX ORDER — AMITRIPTYLINE HYDROCHLORIDE 10 MG/1
TAKE 1 TABLET BY MOUTH EVERY DAY AT BEDTIME FOR 30 DAYS TABLET, FILM COATED ORAL
Qty: 90 TABLET | Refills: 1 | Status: ACTIVE | COMMUNITY

## 2024-11-11 RX ORDER — PANTOPRAZOLE SODIUM 40 MG/1
1 TABLET TABLET, DELAYED RELEASE ORAL ONCE A DAY
Qty: 90 TABLET | Refills: 3 | Status: ACTIVE | COMMUNITY
Start: 2024-08-21

## 2024-11-11 RX ORDER — TOPIRAMATE 25 MG/1
TAKE 1 TABLET BY MOUTH EVERY DAY TABLET ORAL
Qty: 90 EACH | Refills: 0 | Status: ON HOLD | COMMUNITY

## 2024-11-11 RX ORDER — LEVOTHYROXINE SODIUM 25 UG/1
TABLET ORAL
Qty: 90 TABLET | Status: ACTIVE | COMMUNITY

## 2024-11-11 RX ORDER — ONDANSETRON 4 MG/1
1 TABLET ON THE TONGUE AND ALLOW TO DISSOLVE TABLET, ORALLY DISINTEGRATING ORAL
Qty: 60 | Refills: 5 | Status: ACTIVE | COMMUNITY
Start: 2024-08-28

## 2024-11-11 RX ORDER — HYOSCYAMINE SULFATE 0.12 MG/1
1 TABLET UNDER THE TONGUE AND ALLOW TO DISSOLVE TABLET SUBLINGUAL
Qty: 120 | Refills: 6 | Status: ACTIVE | COMMUNITY
Start: 2024-08-28 | End: 2025-03-26

## 2024-11-11 RX ORDER — HYOSCYAMINE SULFATE 0.125 MG
1 TABLET ON THE TONGUE AND ALLOW TO DISSOLVE  AS NEEDED TABLET,DISINTEGRATING ORAL
Status: ACTIVE | COMMUNITY

## 2024-11-11 RX ORDER — COLESTIPOL HYDROCHLORIDE 1 G/1
1 TABLETS TABLET, FILM COATED ORAL ONCE A DAY
Qty: 30 TABLET | Refills: 3 | Status: ACTIVE | COMMUNITY
Start: 2024-08-01

## 2024-11-11 RX ORDER — EPINEPHRINE 0.3 MG/.3ML
INJECTION SUBCUTANEOUS
Qty: 2 EACH | Status: ACTIVE | COMMUNITY

## 2024-11-11 RX ORDER — EZETIMIBE 10 MG/1
TAKE 1 TABLET BY MOUTH EVERY DAY FOR 90 DAYS TABLET ORAL
Qty: 90 EACH | Refills: 0 | Status: ACTIVE | COMMUNITY

## 2024-11-11 RX ORDER — ALBUTEROL SULFATE AND BUDESONIDE 90; 80 UG/1; UG/1
2 PUFFS AS NEEDED AEROSOL, METERED RESPIRATORY (INHALATION)
Status: ACTIVE | COMMUNITY

## 2024-11-11 RX ORDER — PANTOPRAZOLE SODIUM 40 MG/1
1 TABLET TABLET, DELAYED RELEASE ORAL ONCE A DAY
Qty: 30 TABLET | Refills: 3 | Status: ACTIVE | COMMUNITY
Start: 2024-07-03

## 2024-11-11 RX ORDER — MONTELUKAST SODIUM 10 MG/1
TAKE 1 TABLET BY MOUTH EVERY DAY FOR 90 DAYS TABLET, FILM COATED ORAL
Qty: 90 EACH | Refills: 1 | Status: ACTIVE | COMMUNITY

## 2024-11-11 RX ORDER — ESCITALOPRAM OXALATE 20 MG/1
TAKE 1 TABLET BY MOUTH EVERY DAY FOR 90 DAYS TABLET ORAL
Qty: 90 EACH | Refills: 0 | Status: ACTIVE | COMMUNITY

## 2024-11-11 RX ORDER — CLONAZEPAM 1 MG/1
TAKE 1 TABLET BY MOUTH EVERY DAY TABLET ORAL
Qty: 30 EACH | Refills: 3 | Status: ON HOLD | COMMUNITY

## 2024-11-11 NOTE — HPI-TODAY'S VISIT:
Very pleasant 54-year-old female seen today for complaints of IBS diarrhea predominant.  Patient was last seen in office in August she had been having severe esophageal spasms and was losing weight.  This has resolved and she has been gaining weight.  She is seen today for IBS symptoms with loose stools that occur about twice daily with urgency.  She tries to avoid dicyclomine or colestipol.  No particular foods exacerbate her symptoms.  She does report recently mildly elevated liver enzymes with primary care.  She was instructed to avoid Tylenol and herbal supplements.  She denies abdominal pain and does not drink alcohol.

## 2025-02-11 ENCOUNTER — OFFICE VISIT (OUTPATIENT)
Dept: URBAN - METROPOLITAN AREA CLINIC 2 | Facility: CLINIC | Age: 55
End: 2025-02-11